# Patient Record
Sex: FEMALE | Race: WHITE
[De-identification: names, ages, dates, MRNs, and addresses within clinical notes are randomized per-mention and may not be internally consistent; named-entity substitution may affect disease eponyms.]

---

## 2017-06-02 ENCOUNTER — HOSPITAL ENCOUNTER (OUTPATIENT)
Dept: HOSPITAL 53 - M LAB | Age: 62
End: 2017-06-02
Attending: PHYSICIAN ASSISTANT
Payer: COMMERCIAL

## 2017-06-02 DIAGNOSIS — R19.7: Primary | ICD-10-CM

## 2017-06-02 LAB
ALBUMIN SERPL BCG-MCNC: 3.7 GM/DL (ref 3.2–5.2)
ALBUMIN/GLOB SERPL: 1.32 {RATIO} (ref 1–1.93)
ALP SERPL-CCNC: 55 U/L (ref 45–117)
ALT SERPL W P-5'-P-CCNC: 25 U/L (ref 12–78)
ANION GAP SERPL CALC-SCNC: 6 MEQ/L (ref 8–16)
AST SERPL-CCNC: 18 U/L (ref 15–37)
BILIRUB SERPL-MCNC: 0.3 MG/DL (ref 0.2–1)
BUN SERPL-MCNC: 21 MG/DL (ref 7–18)
CALCIUM SERPL-MCNC: 8.7 MG/DL (ref 8.8–10.2)
CHLORIDE SERPL-SCNC: 105 MEQ/L (ref 98–107)
CO2 SERPL-SCNC: 30 MEQ/L (ref 21–32)
CREAT SERPL-MCNC: 0.96 MG/DL (ref 0.55–1.02)
ERYTHROCYTE [DISTWIDTH] IN BLOOD BY AUTOMATED COUNT: 12 % (ref 11.5–14.5)
GFR SERPL CREATININE-BSD FRML MDRD: > 60 ML/MIN/{1.73_M2} (ref 45–?)
GLUCOSE SERPL-MCNC: 83 MG/DL (ref 80–110)
IGA SERPL-MCNC: 120 MG/DL (ref 70–400)
MCH RBC QN AUTO: 31 PG (ref 27–33)
MCHC RBC AUTO-ENTMCNC: 33 G/DL (ref 32–36.5)
MCV RBC AUTO: 94.1 FL (ref 80–96)
PLATELET # BLD AUTO: 242 K/MM3 (ref 150–450)
POTASSIUM SERPL-SCNC: 5.1 MEQ/L (ref 3.5–5.1)
PROT SERPL-MCNC: 6.5 GM/DL (ref 6.4–8.2)
SODIUM SERPL-SCNC: 141 MEQ/L (ref 136–145)
T4 FREE SERPL-MCNC: 1.04 NG/DL (ref 0.76–1.46)
WBC # BLD AUTO: 4.2 K/MM3 (ref 4–10)

## 2017-06-06 ENCOUNTER — HOSPITAL ENCOUNTER (OUTPATIENT)
Dept: HOSPITAL 53 - M LAB REF | Age: 62
End: 2017-06-06
Attending: PHYSICIAN ASSISTANT
Payer: COMMERCIAL

## 2017-06-06 DIAGNOSIS — R19.7: Primary | ICD-10-CM

## 2017-06-07 ENCOUNTER — HOSPITAL ENCOUNTER (OUTPATIENT)
Dept: HOSPITAL 53 - M RAD | Age: 62
End: 2017-06-07
Attending: PHYSICIAN ASSISTANT
Payer: COMMERCIAL

## 2017-06-07 DIAGNOSIS — R19.7: Primary | ICD-10-CM

## 2017-06-08 NOTE — REP
Clinical:  Sitz marker study.

 

Technique:  Single supine view of the abdomen and pelvis.

 

Findings:

Few residual sitz markers are identified in the cecum (four markers) and

transverse colon (single marker).  Bowel gas pattern is nonspecific.  No

organomegaly.  No abnormal calcifications.  Skeletal structures intact.

 

Impression:

Few residual sitz markers.

Nonspecific bowel gas pattern.

 

 

Signed by

Pepe Jaime MD 06/08/2017 03:31 A

## 2017-07-28 ENCOUNTER — HOSPITAL ENCOUNTER (OUTPATIENT)
Dept: HOSPITAL 53 - M OPP | Age: 62
Discharge: HOME | End: 2017-07-28
Attending: INTERNAL MEDICINE
Payer: COMMERCIAL

## 2017-07-28 VITALS — SYSTOLIC BLOOD PRESSURE: 100 MMHG | DIASTOLIC BLOOD PRESSURE: 64 MMHG

## 2017-07-28 VITALS — HEIGHT: 64 IN | BODY MASS INDEX: 21.34 KG/M2 | WEIGHT: 125 LBS

## 2017-07-28 DIAGNOSIS — Z94.7: ICD-10-CM

## 2017-07-28 DIAGNOSIS — K58.1: ICD-10-CM

## 2017-07-28 DIAGNOSIS — R19.4: ICD-10-CM

## 2017-07-28 DIAGNOSIS — K58.2: ICD-10-CM

## 2017-07-28 DIAGNOSIS — Q43.8: ICD-10-CM

## 2017-07-28 DIAGNOSIS — Z88.1: ICD-10-CM

## 2017-07-28 DIAGNOSIS — K64.8: ICD-10-CM

## 2017-07-28 DIAGNOSIS — F42.9: ICD-10-CM

## 2017-07-28 DIAGNOSIS — K57.30: ICD-10-CM

## 2017-07-28 DIAGNOSIS — R00.8: ICD-10-CM

## 2017-07-28 DIAGNOSIS — R19.7: Primary | ICD-10-CM

## 2017-07-28 DIAGNOSIS — Z79.899: ICD-10-CM

## 2017-07-28 DIAGNOSIS — M85.80: ICD-10-CM

## 2017-07-28 DIAGNOSIS — R07.89: ICD-10-CM

## 2017-07-28 DIAGNOSIS — Z91.011: ICD-10-CM

## 2017-07-28 DIAGNOSIS — Q61.2: ICD-10-CM

## 2017-07-28 DIAGNOSIS — Z80.3: ICD-10-CM

## 2017-07-28 DIAGNOSIS — Z80.1: ICD-10-CM

## 2017-07-28 DIAGNOSIS — R31.29: ICD-10-CM

## 2017-07-28 NOTE — ROOR
________________________________________________________________________________

Patient Name: Kristen Avelar              Procedure Date: 7/28/2017 9:46 AM

MRN: [id number]                          Account Number: [id number]

YOB: 1955                Age: 61

Room: [facility]                            Gender: Female

Note Status: Finalized                 

________________________________________________________________________________

 

Procedure:           Colonoscopy

Indications:         Irritable bowel syndrome with constipation, Mixed 

                     irritable bowel syndrome. Constipation with overflow 

                     diarrhea suspected, abnormal Sitzmarker testing.

Providers:           Antonio NUGENT MD

Referring MD:        Talha DAMON DO

Requesting Provider: 

Medicines:           Monitored Anesthesia Care

Complications:       No immediate complications.

________________________________________________________________________________

Procedure:           Pre-Anesthesia Assessment:

                     - The heart rate, respiratory rate, oxygen saturations, 

                     blood pressure, adequacy of pulmonary ventilation, and 

                     response to care were monitored throughout the procedure.

                     The Colonoscope was introduced through the anus and 

                     advanced to 5 cm into the ileum. The colonoscopy was 

                     performed without difficulty. The patient tolerated the 

                     procedure well. The quality of the bowel preparation was 

                     good.

                                                                                

Findings:

     The perianal and digital rectal examinations were normal.

     Internal hemorrhoids were found during retroflexion. The hemorrhoids were 

     medium-sized.

     A few small-mouthed diverticula were found in the sigmoid colon.

     The colon (entire examined portion) was redundant.

     The entire examined colon appeared normal on direct and retroflexion 

     views.

     The terminal ileum appeared normal.

                                                                                

Impression:          - Internal hemorrhoids.

                     - Mild diverticulosis in the sigmoid colon.

                     - Redundant colon.

                     - The entire examined colon is normal on direct and 

                     retroflexion views.

                     - The examined portion of the ileum was normal.

                     - No specimens collected.

Recommendation:      - Continue present medications.

                     - depending on results with Miralax, consideration may be 

                     given to a trial on Linzess or Amitiza.

                                                                                

 

Antonio Nugent MD

________________

Antonio NUGENT MD

7/28/2017 10:09:38 AM

This report has been signed electronically.

Number of Addenda: 0

 

Note Initiated On: 7/28/2017 9:46 AM

Estimated Blood Loss:

     Estimated blood loss: none.

## 2023-10-11 ENCOUNTER — HOSPITAL ENCOUNTER (OUTPATIENT)
Dept: HOSPITAL 53 - M SFHCWAGY | Age: 68
End: 2023-10-11
Attending: NURSE PRACTITIONER
Payer: MEDICARE

## 2023-10-11 DIAGNOSIS — Z12.4: Primary | ICD-10-CM

## 2023-10-11 DIAGNOSIS — N95.8: ICD-10-CM
